# Patient Record
Sex: MALE | Race: WHITE | Employment: PART TIME | URBAN - METROPOLITAN AREA
[De-identification: names, ages, dates, MRNs, and addresses within clinical notes are randomized per-mention and may not be internally consistent; named-entity substitution may affect disease eponyms.]

---

## 2017-03-07 RX ORDER — LISINOPRIL 20 MG/1
TABLET ORAL
Qty: 90 TABLET | Refills: 3 | Status: SHIPPED | OUTPATIENT
Start: 2017-03-07 | End: 2017-09-28 | Stop reason: SDUPTHER

## 2017-03-09 ENCOUNTER — OFFICE VISIT (OUTPATIENT)
Dept: CARDIOLOGY CLINIC | Age: 72
End: 2017-03-09

## 2017-03-09 VITALS
WEIGHT: 289 LBS | HEART RATE: 78 BPM | SYSTOLIC BLOOD PRESSURE: 120 MMHG | BODY MASS INDEX: 39.14 KG/M2 | DIASTOLIC BLOOD PRESSURE: 60 MMHG | HEIGHT: 72 IN

## 2017-03-09 DIAGNOSIS — I25.10 CORONARY ARTERY DISEASE INVOLVING NATIVE CORONARY ARTERY OF NATIVE HEART WITHOUT ANGINA PECTORIS: Chronic | ICD-10-CM

## 2017-03-09 DIAGNOSIS — I10 ESSENTIAL HYPERTENSION: Primary | ICD-10-CM

## 2017-03-09 DIAGNOSIS — E78.49 OTHER HYPERLIPIDEMIA: ICD-10-CM

## 2017-03-09 PROCEDURE — 93000 ELECTROCARDIOGRAM COMPLETE: CPT | Performed by: INTERNAL MEDICINE

## 2017-03-09 PROCEDURE — 99214 OFFICE O/P EST MOD 30 MIN: CPT | Performed by: INTERNAL MEDICINE

## 2017-09-12 LAB
AVERAGE GLUCOSE: NORMAL
HBA1C MFR BLD: 7.3 %

## 2017-09-20 DIAGNOSIS — I25.10 CORONARY ARTERY DISEASE INVOLVING NATIVE CORONARY ARTERY OF NATIVE HEART WITHOUT ANGINA PECTORIS: Primary | Chronic | ICD-10-CM

## 2017-09-25 RX ORDER — ATORVASTATIN CALCIUM 40 MG/1
TABLET, FILM COATED ORAL
Qty: 30 TABLET | Refills: 0 | Status: SHIPPED | OUTPATIENT
Start: 2017-09-25 | End: 2017-09-28 | Stop reason: SDUPTHER

## 2017-09-25 RX ORDER — ATENOLOL 100 MG/1
TABLET ORAL
Qty: 30 TABLET | Refills: 6 | Status: SHIPPED | OUTPATIENT
Start: 2017-09-25 | End: 2017-09-28 | Stop reason: SDUPTHER

## 2017-09-28 ENCOUNTER — OFFICE VISIT (OUTPATIENT)
Dept: CARDIOLOGY CLINIC | Age: 72
End: 2017-09-28

## 2017-09-28 VITALS
WEIGHT: 295 LBS | SYSTOLIC BLOOD PRESSURE: 110 MMHG | BODY MASS INDEX: 39.96 KG/M2 | DIASTOLIC BLOOD PRESSURE: 60 MMHG | HEIGHT: 72 IN | HEART RATE: 82 BPM

## 2017-09-28 DIAGNOSIS — I25.10 CORONARY ARTERY DISEASE INVOLVING NATIVE CORONARY ARTERY OF NATIVE HEART WITHOUT ANGINA PECTORIS: Primary | Chronic | ICD-10-CM

## 2017-09-28 DIAGNOSIS — E78.5 HYPERLIPIDEMIA, UNSPECIFIED HYPERLIPIDEMIA TYPE: ICD-10-CM

## 2017-09-28 DIAGNOSIS — I10 ESSENTIAL HYPERTENSION: ICD-10-CM

## 2017-09-28 PROCEDURE — 99214 OFFICE O/P EST MOD 30 MIN: CPT | Performed by: INTERNAL MEDICINE

## 2017-09-28 RX ORDER — ATENOLOL 100 MG/1
TABLET ORAL
Qty: 30 TABLET | Refills: 11 | Status: SHIPPED | OUTPATIENT
Start: 2017-09-28

## 2017-09-28 RX ORDER — ATORVASTATIN CALCIUM 40 MG/1
TABLET, FILM COATED ORAL
Qty: 30 TABLET | Refills: 11 | Status: SHIPPED | OUTPATIENT
Start: 2017-09-28

## 2017-09-28 RX ORDER — LISINOPRIL 20 MG/1
TABLET ORAL
Qty: 30 TABLET | Refills: 11 | Status: SHIPPED | OUTPATIENT
Start: 2017-09-28

## 2017-09-28 NOTE — LETTER
(5/13/1996); back surgery (1990); colectomy (10/7/15); lumbar laminectomy; and Colonoscopy. Social History:   reports that he quit smoking about 18 years ago. He has a 90.00 pack-year smoking history. He has never used smokeless tobacco. He reports that he does not drink alcohol or use illicit drugs. Family History:  family history includes Lung Cancer in his father. Allergies:  Review of patient's allergies indicates no known allergies. Review of Systems:   · Constitutional: there has been no unanticipated weight loss, has gained weight. No change in energy or activity level   · Eyes: No visual changes   · ENT: No Headaches, hearing loss or vertigo. No mouth sores or sore throat. · Cardiovascular: Reviewed in HPI  · Respiratory: No cough or wheezing, no sputum production. · Gastrointestinal: No abdominal pain, appetite loss, blood in stools. No change in bowel or bladder habits. · Genitourinary: No nocturia, dysuria, trouble voiding  · Musculoskeletal:  No gait disturbance, weakness or joint complaints. · Integumentary: No rash or pruritis. · Neurological: No headache, change in muscle strength, numbness or tingling. No change in gait, balance, coordination, mood, affect, memory, mentation, behavior. · Psychiatric: No anxiety or depression  · Endocrine: No malaise or fever  · Hematologic/Lymphatic: No abnormal bruising or bleeding, blood clots or swollen lymph nodes. · Allergic/Immunologic: No nasal congestion or hives. Physical Examination:    Vitals:    09/28/17 0859   BP: 110/60   Site: Left Arm   Position: Sitting   Cuff Size: Large Adult   Pulse: 82   Weight: 295 lb (133.8 kg)   Height: 6' (1.829 m)     Body mass index is 40.01 kg/(m^2).      Wt Readings from Last 3 Encounters:   09/28/17 295 lb (133.8 kg)   04/11/17 289 lb 9.6 oz (131.4 kg)   03/09/17 289 lb (131.1 kg)     BP Readings from Last 3 Encounters:   09/28/17 110/60   04/11/17 128/70   03/09/17 120/60

## 2017-09-28 NOTE — PROGRESS NOTES
· Eyes: No visual changes   · ENT: No Headaches, hearing loss or vertigo. No mouth sores or sore throat. · Cardiovascular: Reviewed in HPI  · Respiratory: No cough or wheezing, no sputum production. · Gastrointestinal: No abdominal pain, appetite loss, blood in stools. No change in bowel or bladder habits. · Genitourinary: No nocturia, dysuria, trouble voiding  · Musculoskeletal:  No gait disturbance, weakness or joint complaints. · Integumentary: No rash or pruritis. · Neurological: No headache, change in muscle strength, numbness or tingling. No change in gait, balance, coordination, mood, affect, memory, mentation, behavior. · Psychiatric: No anxiety or depression  · Endocrine: No malaise or fever  · Hematologic/Lymphatic: No abnormal bruising or bleeding, blood clots or swollen lymph nodes. · Allergic/Immunologic: No nasal congestion or hives. Physical Examination:    Vitals:    09/28/17 0859   BP: 110/60   Site: Left Arm   Position: Sitting   Cuff Size: Large Adult   Pulse: 82   Weight: 295 lb (133.8 kg)   Height: 6' (1.829 m)     Body mass index is 40.01 kg/(m^2).      Wt Readings from Last 3 Encounters:   09/28/17 295 lb (133.8 kg)   04/11/17 289 lb 9.6 oz (131.4 kg)   03/09/17 289 lb (131.1 kg)     BP Readings from Last 3 Encounters:   09/28/17 110/60   04/11/17 128/70   03/09/17 120/60       Constitutional and General Appearance:  Appears stated age, obese   Respiratory:  · Normal excursion and expansion without use of accessory muscles  · Resp Auscultation: Normal breath sounds without dullness  Cardiovascular:  · The apical impulses not displaced  · Heart is regular rate and rhythm with normal S1, S2  · The carotid upstroke is normal, no bruit noted   · JVP is not elevated  · Peripheral pulses are symmetrical  · There is no clubbing, cyanosis of the extremities  · No edema  · Femoral Arteries: 2+ and equal  · Pedal Pulses: 2+ and equal   Abdomen:  · No masses or tenderness  · Normal bowel

## 2017-10-03 NOTE — COMMUNICATION BODY
[facility]   Cardiac Evaluation      Patient: Roger Louis  YOB: 1945  Date: 9/28/17       Chief Complaint   Patient presents with    Coronary Artery Disease    Hyperlipidemia    Hypertension      Referring provider: Najma Vasquez MD    History of Present Illness:   Mr. Teagan Meneses comes to the office today for routine follow up. His history includes coronary disease diagnosed many years ago, hypertension, and hyperlipidemia. He is claustrophobic, so when stress tests have been done they are usually plain; he was able to undergo a Lexiscan on 9/28/15, and it was stable. He has colon cancer and has finished chemotherapy; bowel resection by Dr. Tania Perez on 10/7/15. Today, Mr. Teagan Meneses states he has been well. He denies exertional chest pain, VILLANUEVA/PND, palpitations, light-headedness, edema. He has decided he is not going to do anymore chemotherapy for his colon cancer. Past Medical History:   has a past medical history of Arthritis; CAD (coronary artery disease); COPD (chronic obstructive pulmonary disease) ([facility] [address] Diabetes; Edema of both legs; Hyperlipidemia; Hypertension; MI (myocardial infarction) ([facility] [address] Obesity; and Post PTCA. Colon cancer    Surgical History:   has a past surgical history that includes Cardiac catheterization; Coronary angioplasty with stent (1996); Cardiac catheterization (5/13/1996); back surgery (1990); colectomy (10/7/15); lumbar laminectomy; and Colonoscopy. Social History:   reports that he quit smoking about 18 years ago. He has a 90.00 pack-year smoking history. He has never used smokeless tobacco. He reports that he does not drink alcohol or use illicit drugs. Family History:  family history includes Lung Cancer in his father. Allergies:  Review of patient's allergies indicates no known allergies. Review of Systems:   · Constitutional: there has been no unanticipated weight loss, has gained weight.  No change in energy or activity level sounds  Neurological/Psychiatric:  · Alert and oriented x3  · Moves all extremities well  · Exhibits normal gait balance and coordination    Assessment/Plan  1. CAD (coronary artery disease): Stable. No anginal symptoms. Atenolol 100mg qd  -Lexiscan [place] 9/28/15> EF 43%, fixed defect, frequent PVC's   -EKG 9/25/15> NSR frequent PVC's. He restarted on the atenolol.   -Plain GXT 8/11/08> no ischemia  -Echo 4/14> ([address]) EF 50-55%, grade I DD  -GXT [place] 10/11/06> maximal olamide protocol GXT negative for CP or ST changes of ischemia at a DP of 24, 464. Occasional PVC's at peak exercise  -Angiogram 5/13/96> LVEDP 20mmHg,  1 vessel CAD involving a very high grade stenosis of RCA. Represents restenosis from previous angioplasty 12/23/05- transferred to [facility] for repeat intervention. (No report). 2. Hyperlipemia: well controlled on labs 9/17 ([facility])   3. Essential hypertension: Well controlled, BUN/creat 15/0.96 on labs 9/17   4. Carotid stenosis: Stable. <15% bilateral on doppler 6/12     No med changes. Encouraged to work on weight loss through diet and exercise. Follow up 6 months. Thank you for allowing to me to participate in the care of [employer].

## 2018-01-05 ENCOUNTER — HOSPITAL ENCOUNTER (OUTPATIENT)
Dept: ULTRASOUND IMAGING | Age: 73
Discharge: HOME | End: 2018-01-05
Attending: FAMILY MEDICINE | Admitting: FAMILY MEDICINE

## 2018-01-05 DIAGNOSIS — R10.13 EPIGASTRIC PAIN: ICD-10-CM

## 2018-05-21 ENCOUNTER — HOSPITAL ENCOUNTER (OUTPATIENT)
Dept: CT IMAGING | Age: 73
Discharge: HOME | End: 2018-05-21
Attending: FAMILY MEDICINE | Admitting: FAMILY MEDICINE

## 2018-05-21 DIAGNOSIS — R10.84 GENERALIZED ABDOMINAL PAIN: ICD-10-CM
